# Patient Record
Sex: MALE | Race: WHITE
[De-identification: names, ages, dates, MRNs, and addresses within clinical notes are randomized per-mention and may not be internally consistent; named-entity substitution may affect disease eponyms.]

---

## 2019-01-24 ENCOUNTER — HOSPITAL ENCOUNTER (OUTPATIENT)
Dept: HOSPITAL 8 - CFH | Age: 69
Discharge: HOME | End: 2019-01-24
Attending: REGISTERED NURSE
Payer: MEDICARE

## 2019-01-24 DIAGNOSIS — J18.1: Primary | ICD-10-CM

## 2019-01-24 DIAGNOSIS — R91.8: ICD-10-CM

## 2019-01-24 PROCEDURE — 71046 X-RAY EXAM CHEST 2 VIEWS: CPT

## 2019-04-24 ENCOUNTER — HOSPITAL ENCOUNTER (OUTPATIENT)
Dept: HOSPITAL 8 - PETCFH | Age: 69
Discharge: HOME | End: 2019-04-24
Attending: RADIOLOGY
Payer: MEDICARE

## 2019-04-24 DIAGNOSIS — K57.30: ICD-10-CM

## 2019-04-24 DIAGNOSIS — C43.8: ICD-10-CM

## 2019-04-24 DIAGNOSIS — C85.90: Primary | ICD-10-CM

## 2019-04-24 DIAGNOSIS — I25.10: ICD-10-CM

## 2019-04-24 PROCEDURE — A9552 F18 FDG: HCPCS

## 2019-04-24 PROCEDURE — 78816 PET IMAGE W/CT FULL BODY: CPT

## 2019-04-29 ENCOUNTER — HOSPITAL ENCOUNTER (OUTPATIENT)
Dept: HOSPITAL 8 - CFH | Age: 69
Discharge: HOME | End: 2019-04-29
Attending: INTERNAL MEDICINE
Payer: MEDICARE

## 2019-04-29 DIAGNOSIS — G31.89: ICD-10-CM

## 2019-04-29 DIAGNOSIS — C85.91: Primary | ICD-10-CM

## 2019-04-29 PROCEDURE — 70543 MRI ORBT/FAC/NCK W/O &W/DYE: CPT

## 2019-04-29 PROCEDURE — 70553 MRI BRAIN STEM W/O & W/DYE: CPT

## 2019-04-29 PROCEDURE — A9585 GADOBUTROL INJECTION: HCPCS

## 2019-05-01 ENCOUNTER — HOSPITAL ENCOUNTER (OUTPATIENT)
Dept: HOSPITAL 8 - OUT | Age: 69
Discharge: HOME | End: 2019-05-01
Attending: RADIOLOGY
Payer: MEDICARE

## 2019-05-01 VITALS — DIASTOLIC BLOOD PRESSURE: 80 MMHG | SYSTOLIC BLOOD PRESSURE: 157 MMHG

## 2019-05-01 VITALS — WEIGHT: 203.93 LBS | HEIGHT: 69 IN | BODY MASS INDEX: 30.2 KG/M2

## 2019-05-01 DIAGNOSIS — C85.91: Primary | ICD-10-CM

## 2019-05-01 DIAGNOSIS — Z88.8: ICD-10-CM

## 2019-05-01 DIAGNOSIS — I10: ICD-10-CM

## 2019-05-01 DIAGNOSIS — Z98.41: ICD-10-CM

## 2019-05-01 DIAGNOSIS — K21.9: ICD-10-CM

## 2019-05-01 DIAGNOSIS — E78.00: ICD-10-CM

## 2019-05-01 DIAGNOSIS — Z88.5: ICD-10-CM

## 2019-05-01 DIAGNOSIS — F41.9: ICD-10-CM

## 2019-05-01 DIAGNOSIS — Z87.442: ICD-10-CM

## 2019-05-01 DIAGNOSIS — E03.9: ICD-10-CM

## 2019-05-01 DIAGNOSIS — M10.9: ICD-10-CM

## 2019-05-01 DIAGNOSIS — Z85.46: ICD-10-CM

## 2019-05-01 DIAGNOSIS — Z98.890: ICD-10-CM

## 2019-05-01 DIAGNOSIS — F32.9: ICD-10-CM

## 2019-05-01 DIAGNOSIS — Z87.01: ICD-10-CM

## 2019-05-01 LAB
BASOPHILS # BLD AUTO: 0.01 X10^3/UL (ref 0–0.1)
BASOPHILS NFR BLD AUTO: 0 % (ref 0–1)
EOSINOPHIL # BLD AUTO: 0.13 X10^3/UL (ref 0–0.4)
EOSINOPHIL NFR BLD AUTO: 2 % (ref 1–7)
ERYTHROCYTE [DISTWIDTH] IN BLOOD BY AUTOMATED COUNT: 13.9 % (ref 9.4–14.8)
LYMPHOCYTES # BLD AUTO: 1.64 X10^3/UL (ref 1–3.4)
LYMPHOCYTES NFR BLD AUTO: 25 % (ref 22–44)
MCH RBC QN AUTO: 31.2 PG (ref 27.5–34.5)
MCHC RBC AUTO-ENTMCNC: 33.5 G/DL (ref 33.2–36.2)
MCV RBC AUTO: 93.3 FL (ref 81–97)
MD: NO
MONOCYTES # BLD AUTO: 0.8 X10^3/UL (ref 0.2–0.8)
MONOCYTES NFR BLD AUTO: 12 % (ref 2–9)
NEUTROPHILS # BLD AUTO: 4.09 X10^3/UL (ref 1.8–6.8)
NEUTROPHILS NFR BLD AUTO: 61 % (ref 42–75)
PLATELET # BLD AUTO: 287 X10^3/UL (ref 130–400)
PMV BLD AUTO: 8.2 FL (ref 7.4–10.4)
RBC # BLD AUTO: 4.64 X10^6/UL (ref 4.38–5.82)

## 2019-05-01 PROCEDURE — 77012 CT SCAN FOR NEEDLE BIOPSY: CPT

## 2019-05-01 PROCEDURE — 99156 MOD SED OTH PHYS/QHP 5/>YRS: CPT

## 2019-05-01 PROCEDURE — 85060 BLOOD SMEAR INTERPRETATION: CPT

## 2019-05-01 PROCEDURE — 99157 MOD SED OTHER PHYS/QHP EA: CPT

## 2019-05-01 PROCEDURE — 88264 CHROMOSOME ANALYSIS 20-25: CPT

## 2019-05-01 PROCEDURE — 36415 COLL VENOUS BLD VENIPUNCTURE: CPT

## 2019-05-01 PROCEDURE — 88237 TISSUE CULTURE BONE MARROW: CPT

## 2019-05-01 PROCEDURE — 38222 DX BONE MARROW BX & ASPIR: CPT

## 2019-05-01 PROCEDURE — 85025 COMPLETE CBC W/AUTO DIFF WBC: CPT

## 2019-05-01 PROCEDURE — 88311 DECALCIFY TISSUE: CPT

## 2019-05-01 PROCEDURE — 88280 CHROMOSOME KARYOTYPE STUDY: CPT

## 2019-05-01 PROCEDURE — 85097 BONE MARROW INTERPRETATION: CPT

## 2019-05-01 PROCEDURE — 88305 TISSUE EXAM BY PATHOLOGIST: CPT

## 2019-05-03 ENCOUNTER — HOSPITAL ENCOUNTER (OUTPATIENT)
Dept: HOSPITAL 8 - CFH | Age: 69
Discharge: HOME | End: 2019-05-03
Attending: INTERNAL MEDICINE
Payer: MEDICARE

## 2019-05-03 DIAGNOSIS — I08.0: ICD-10-CM

## 2019-05-03 DIAGNOSIS — Z13.6: Primary | ICD-10-CM

## 2019-05-03 DIAGNOSIS — E78.5: ICD-10-CM

## 2019-05-03 DIAGNOSIS — I10: ICD-10-CM

## 2019-05-03 PROCEDURE — 93306 TTE W/DOPPLER COMPLETE: CPT

## 2019-05-08 ENCOUNTER — HOSPITAL ENCOUNTER (OUTPATIENT)
Dept: HOSPITAL 8 - OUT | Age: 69
Discharge: HOME | End: 2019-05-08
Attending: INTERNAL MEDICINE
Payer: MEDICARE

## 2019-05-08 VITALS — SYSTOLIC BLOOD PRESSURE: 154 MMHG | DIASTOLIC BLOOD PRESSURE: 71 MMHG

## 2019-05-08 VITALS — WEIGHT: 205.69 LBS | BODY MASS INDEX: 29.45 KG/M2 | HEIGHT: 70 IN

## 2019-05-08 DIAGNOSIS — I10: ICD-10-CM

## 2019-05-08 DIAGNOSIS — Z98.49: ICD-10-CM

## 2019-05-08 DIAGNOSIS — Z45.2: Primary | ICD-10-CM

## 2019-05-08 DIAGNOSIS — C85.90: ICD-10-CM

## 2019-05-08 DIAGNOSIS — Z87.891: ICD-10-CM

## 2019-05-08 DIAGNOSIS — Z98.890: ICD-10-CM

## 2019-05-08 PROCEDURE — C1788 PORT, INDWELLING, IMP: HCPCS

## 2019-05-08 PROCEDURE — 99157 MOD SED OTHER PHYS/QHP EA: CPT

## 2019-05-08 PROCEDURE — 77001 FLUOROGUIDE FOR VEIN DEVICE: CPT

## 2019-05-08 PROCEDURE — 36561 INSERT TUNNELED CV CATH: CPT

## 2019-05-08 PROCEDURE — 76937 US GUIDE VASCULAR ACCESS: CPT

## 2019-05-08 PROCEDURE — 99156 MOD SED OTH PHYS/QHP 5/>YRS: CPT

## 2019-05-30 ENCOUNTER — HOSPITAL ENCOUNTER (OUTPATIENT)
Dept: HOSPITAL 8 - CFH | Age: 69
Discharge: HOME | End: 2019-05-30
Attending: INTERNAL MEDICINE
Payer: MEDICARE

## 2019-05-30 DIAGNOSIS — Z51.11: Primary | ICD-10-CM

## 2019-05-30 DIAGNOSIS — D70.1: ICD-10-CM

## 2019-05-30 DIAGNOSIS — C85.11: ICD-10-CM

## 2019-05-30 DIAGNOSIS — R50.9: ICD-10-CM

## 2019-05-30 PROCEDURE — 71046 X-RAY EXAM CHEST 2 VIEWS: CPT

## 2019-06-21 ENCOUNTER — HOSPITAL ENCOUNTER (INPATIENT)
Dept: HOSPITAL 8 - ED | Age: 69
LOS: 2 days | Discharge: HOME | DRG: 809 | End: 2019-06-23
Attending: FAMILY MEDICINE | Admitting: FAMILY MEDICINE
Payer: MEDICARE

## 2019-06-21 VITALS — WEIGHT: 169.8 LBS | HEIGHT: 67 IN | BODY MASS INDEX: 26.65 KG/M2

## 2019-06-21 DIAGNOSIS — E78.5: ICD-10-CM

## 2019-06-21 DIAGNOSIS — Z90.79: ICD-10-CM

## 2019-06-21 DIAGNOSIS — M10.9: ICD-10-CM

## 2019-06-21 DIAGNOSIS — K59.00: ICD-10-CM

## 2019-06-21 DIAGNOSIS — E11.65: ICD-10-CM

## 2019-06-21 DIAGNOSIS — I10: ICD-10-CM

## 2019-06-21 DIAGNOSIS — G47.30: ICD-10-CM

## 2019-06-21 DIAGNOSIS — D70.1: Primary | ICD-10-CM

## 2019-06-21 DIAGNOSIS — C83.30: ICD-10-CM

## 2019-06-21 DIAGNOSIS — E86.0: ICD-10-CM

## 2019-06-21 DIAGNOSIS — R50.81: ICD-10-CM
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
## 2019-06-21 DIAGNOSIS — E03.9: ICD-10-CM

## 2019-06-21 DIAGNOSIS — Z85.46: ICD-10-CM

## 2019-06-21 DIAGNOSIS — T45.1X5A: ICD-10-CM

## 2019-06-21 LAB
<PLATELET ESTIMATE>: ADEQUATE
<PLT MORPHOLOGY>: (no result)
ALBUMIN SERPL-MCNC: 3.3 G/DL (ref 3.4–5)
ALP SERPL-CCNC: 95 U/L (ref 45–117)
ALT SERPL-CCNC: 43 U/L (ref 12–78)
ANION GAP SERPL CALC-SCNC: 7 MMOL/L (ref 5–15)
BAND#(MANUAL): 0.38 X10^3/UL
BASOPHILS NFR BLD MANUAL: 1 % (ref 0–1)
BASOS#(MANUAL): 0.02 X10^3/UL (ref 0–0.1)
BILIRUB DIRECT SERPL-MCNC: NORMAL MG/DL
BILIRUB SERPL-MCNC: 0.9 MG/DL (ref 0.2–1)
CALCIUM SERPL-MCNC: 8.8 MG/DL (ref 8.5–10.1)
CHLORIDE SERPL-SCNC: 101 MMOL/L (ref 98–107)
CREAT SERPL-MCNC: 1.05 MG/DL (ref 0.7–1.3)
CULTURE INDICATED?: NO
EOS#(MANUAL): 0.03 X10^3/UL (ref 0–0.4)
EOS% (MANUAL): 2 % (ref 1–7)
ERYTHROCYTE [DISTWIDTH] IN BLOOD BY AUTOMATED COUNT: 13.3 % (ref 9.4–14.8)
LYMPH#(MANUAL): 0.45 X10^3/UL (ref 1–3.4)
LYMPHS% (MANUAL): 28 % (ref 22–44)
MCH RBC QN AUTO: 31.8 PG (ref 27.5–34.5)
MCHC RBC AUTO-ENTMCNC: 34.5 G/DL (ref 33.2–36.2)
MCV RBC AUTO: 92.2 FL (ref 81–97)
MD: YES
METAMYELOCYTES# (MANUAL): 0.05 X10^3/UL (ref 0–0)
METAMYELOCYTES% (MANUAL): 3 % (ref 0–1)
MICROSCOPIC: (no result)
MONOS#(MANUAL): 0.21 X10^3/UL (ref 0.3–2.7)
MONOS% (MANUAL): 13 % (ref 2–9)
MYELOCYTES# (MANUAL): 0.05 X10^3/UL (ref 0–0)
MYELOCYTES% (MANUAL): 3 % (ref 0–0)
NEUTS BAND NFR BLD: 24 % (ref 0–7)
OTHER CELLS # (MANUAL): 0.02 X10^3/UL (ref 0–0)
OTHER CELLS % (MANUAL): 1 % (ref 0–0)
PLATELET # BLD AUTO: 192 X10^3/UL (ref 130–400)
PMV BLD AUTO: 8.1 FL (ref 7.4–10.4)
PROT SERPL-MCNC: 6.3 G/DL (ref 6.4–8.2)
RBC # BLD AUTO: 3.92 X10^6/UL (ref 4.38–5.82)
REACTIVE LYMPHS # (MANUAL): 0.1 X10^3/UL (ref 0–0)
REACTIVE LYMPHS % (MANUAL): 6 % (ref 0–0)
SEG#(MANUAL): 0.3 X10^3/UL (ref 1.8–6.8)
SEGS% (MANUAL): 19 % (ref 42–75)
TROPONIN I SERPL-MCNC: < 0.015 NG/ML (ref 0–0.04)

## 2019-06-21 PROCEDURE — 84145 PROCALCITONIN (PCT): CPT

## 2019-06-21 PROCEDURE — 81003 URINALYSIS AUTO W/O SCOPE: CPT

## 2019-06-21 PROCEDURE — 80053 COMPREHEN METABOLIC PANEL: CPT

## 2019-06-21 PROCEDURE — 84484 ASSAY OF TROPONIN QUANT: CPT

## 2019-06-21 PROCEDURE — 36415 COLL VENOUS BLD VENIPUNCTURE: CPT

## 2019-06-21 PROCEDURE — 85651 RBC SED RATE NONAUTOMATED: CPT

## 2019-06-21 PROCEDURE — 86140 C-REACTIVE PROTEIN: CPT

## 2019-06-21 PROCEDURE — 96374 THER/PROPH/DIAG INJ IV PUSH: CPT

## 2019-06-21 PROCEDURE — 85025 COMPLETE CBC W/AUTO DIFF WBC: CPT

## 2019-06-21 PROCEDURE — 83036 HEMOGLOBIN GLYCOSYLATED A1C: CPT

## 2019-06-21 PROCEDURE — 93005 ELECTROCARDIOGRAM TRACING: CPT

## 2019-06-21 PROCEDURE — 82962 GLUCOSE BLOOD TEST: CPT

## 2019-06-21 PROCEDURE — 71045 X-RAY EXAM CHEST 1 VIEW: CPT

## 2019-06-21 PROCEDURE — 87040 BLOOD CULTURE FOR BACTERIA: CPT

## 2019-06-22 VITALS — DIASTOLIC BLOOD PRESSURE: 73 MMHG | SYSTOLIC BLOOD PRESSURE: 157 MMHG

## 2019-06-22 VITALS — DIASTOLIC BLOOD PRESSURE: 69 MMHG | SYSTOLIC BLOOD PRESSURE: 121 MMHG

## 2019-06-22 VITALS — DIASTOLIC BLOOD PRESSURE: 76 MMHG | SYSTOLIC BLOOD PRESSURE: 136 MMHG

## 2019-06-22 VITALS — DIASTOLIC BLOOD PRESSURE: 73 MMHG | SYSTOLIC BLOOD PRESSURE: 132 MMHG

## 2019-06-22 LAB
<PLATELET ESTIMATE>: ADEQUATE
<PLT MORPHOLOGY>: (no result)
ANISOCYTOSIS BLD QL SMEAR: (no result)
BAND#(MANUAL): 0.46 X10^3/UL
BASOPHILS NFR BLD MANUAL: 1 % (ref 0–1)
BASOS#(MANUAL): 0.02 X10^3/UL (ref 0–0.1)
ERYTHROCYTE [DISTWIDTH] IN BLOOD BY AUTOMATED COUNT: 13.5 % (ref 9.4–14.8)
EST. AVERAGE GLUCOSE BLD GHB EST-MCNC: 214 MG/DL (ref 0–126)
HBA1C MFR BLD: 9.1 % (ref 4.2–6.3)
LYMPH#(MANUAL): 0.48 X10^3/UL (ref 1–3.4)
LYMPHS% (MANUAL): 22 % (ref 22–44)
MCH RBC QN AUTO: 32.2 PG (ref 27.5–34.5)
MCHC RBC AUTO-ENTMCNC: 34.6 G/DL (ref 33.2–36.2)
MCV RBC AUTO: 93.3 FL (ref 81–97)
MD: YES
METAMYELOCYTES# (MANUAL): 0.07 X10^3/UL (ref 0–0)
METAMYELOCYTES% (MANUAL): 3 % (ref 0–1)
MONOS#(MANUAL): 0.29 X10^3/UL (ref 0.3–2.7)
MONOS% (MANUAL): 13 % (ref 2–9)
MYELOCYTES# (MANUAL): 0.09 X10^3/UL (ref 0–0)
MYELOCYTES% (MANUAL): 4 % (ref 0–0)
NEUTS BAND NFR BLD: 21 % (ref 0–7)
OVALOCYTES BLD QL SMEAR: (no result)
PLATELET # BLD AUTO: 151 X10^3/UL (ref 130–400)
PMV BLD AUTO: 8.1 FL (ref 7.4–10.4)
POLYCHROMASIA BLD QL SMEAR: (no result)
RBC # BLD AUTO: 3.51 X10^6/UL (ref 4.38–5.82)
REACTIVE LYMPHS # (MANUAL): 0.13 X10^3/UL (ref 0–0)
REACTIVE LYMPHS % (MANUAL): 6 % (ref 0–0)
SEG#(MANUAL): 0.66 X10^3/UL (ref 1.8–6.8)
SEGS% (MANUAL): 30 % (ref 42–75)
TEAR DROPS: (no result)

## 2019-06-22 RX ADMIN — AMLODIPINE BESYLATE SCH MG: 5 TABLET ORAL at 09:41

## 2019-06-22 RX ADMIN — VITAMIN D, TAB 1000IU (100/BT) SCH UNITS: 25 TAB at 09:41

## 2019-06-22 RX ADMIN — INSULIN LISPRO SCH UNITS: 100 INJECTION, SOLUTION INTRAVENOUS; SUBCUTANEOUS at 16:29

## 2019-06-22 RX ADMIN — AMLODIPINE BESYLATE SCH MG: 5 TABLET ORAL at 20:26

## 2019-06-22 RX ADMIN — BENAZEPRIL HYDROCHLORIDE SCH MG: 20 TABLET, COATED ORAL at 09:40

## 2019-06-22 RX ADMIN — LACTOBACILLUS TAB SCH TAB: TAB at 09:40

## 2019-06-22 RX ADMIN — SODIUM CHLORIDE SCH MLS/HR: 0.9 INJECTION, SOLUTION INTRAVENOUS at 01:17

## 2019-06-22 RX ADMIN — METOPROLOL TARTRATE SCH MG: 25 TABLET, FILM COATED ORAL at 09:40

## 2019-06-22 RX ADMIN — LEVOTHYROXINE SODIUM SCH MCG: 25 TABLET ORAL at 09:41

## 2019-06-22 RX ADMIN — MAGNESIUM OXIDE SCH MG: 400 TABLET ORAL at 09:40

## 2019-06-22 RX ADMIN — CEFEPIME SCH MLS/HR: 2 INJECTION, POWDER, FOR SOLUTION INTRAVENOUS at 01:17

## 2019-06-22 RX ADMIN — SODIUM CHLORIDE SCH MLS/HR: 0.9 INJECTION, SOLUTION INTRAVENOUS at 16:30

## 2019-06-22 RX ADMIN — INSULIN LISPRO SCH UNITS: 100 INJECTION, SOLUTION INTRAVENOUS; SUBCUTANEOUS at 20:31

## 2019-06-22 RX ADMIN — CEFEPIME SCH MLS/HR: 2 INJECTION, POWDER, FOR SOLUTION INTRAVENOUS at 16:26

## 2019-06-22 RX ADMIN — ALLOPURINOL SCH MG: 300 TABLET ORAL at 09:40

## 2019-06-22 RX ADMIN — CEFEPIME SCH MLS/HR: 2 INJECTION, POWDER, FOR SOLUTION INTRAVENOUS at 08:10

## 2019-06-22 RX ADMIN — INSULIN LISPRO SCH UNITS: 100 INJECTION, SOLUTION INTRAVENOUS; SUBCUTANEOUS at 11:27

## 2019-06-22 RX ADMIN — INSULIN LISPRO SCH UNITS: 100 INJECTION, SOLUTION INTRAVENOUS; SUBCUTANEOUS at 08:10

## 2019-06-23 VITALS — DIASTOLIC BLOOD PRESSURE: 73 MMHG | SYSTOLIC BLOOD PRESSURE: 163 MMHG

## 2019-06-23 VITALS — DIASTOLIC BLOOD PRESSURE: 69 MMHG | SYSTOLIC BLOOD PRESSURE: 142 MMHG

## 2019-06-23 VITALS — SYSTOLIC BLOOD PRESSURE: 92 MMHG | DIASTOLIC BLOOD PRESSURE: 62 MMHG

## 2019-06-23 LAB
<PLATELET ESTIMATE>: ADEQUATE
<PLT MORPHOLOGY>: (no result)
ALBUMIN SERPL-MCNC: 2.8 G/DL (ref 3.4–5)
ALP SERPL-CCNC: 83 U/L (ref 45–117)
ALT SERPL-CCNC: 30 U/L (ref 12–78)
ANION GAP SERPL CALC-SCNC: 3 MMOL/L (ref 5–15)
ANISOCYTOSIS BLD QL SMEAR: (no result)
BAND#(MANUAL): 0.91 X10^3/UL
BILIRUB SERPL-MCNC: 0.6 MG/DL (ref 0.2–1)
CALCIUM SERPL-MCNC: 8.4 MG/DL (ref 8.5–10.1)
CHLORIDE SERPL-SCNC: 106 MMOL/L (ref 98–107)
CREAT SERPL-MCNC: 1 MG/DL (ref 0.7–1.3)
ERYTHROCYTE [DISTWIDTH] IN BLOOD BY AUTOMATED COUNT: 13.4 % (ref 9.4–14.8)
ERYTHROCYTE [SEDIMENTATION RATE] IN BLOOD BY WESTERGREN METHOD: 57 MM/HR (ref 0–10)
HCT (SEDRATE): 33.1 % (ref 39.2–51.8)
LYMPH#(MANUAL): 0.63 X10^3/UL (ref 1–3.4)
LYMPHS% (MANUAL): 11 % (ref 22–44)
MCH RBC QN AUTO: 31.1 PG (ref 27.5–34.5)
MCHC RBC AUTO-ENTMCNC: 33.6 G/DL (ref 33.2–36.2)
MCV RBC AUTO: 92.5 FL (ref 81–97)
MD: YES
METAMYELOCYTES# (MANUAL): 0.06 X10^3/UL (ref 0–0)
METAMYELOCYTES% (MANUAL): 1 % (ref 0–1)
MONOS#(MANUAL): 0.74 X10^3/UL (ref 0.3–2.7)
MONOS% (MANUAL): 13 % (ref 2–9)
MYELOCYTES# (MANUAL): 0.17 X10^3/UL (ref 0–0)
MYELOCYTES% (MANUAL): 3 % (ref 0–0)
NEUTS BAND NFR BLD: 16 % (ref 0–7)
NRBC % (MANUAL): 1 % (ref 0–1)
OVALOCYTES BLD QL SMEAR: (no result)
PLATELET # BLD AUTO: 160 X10^3/UL (ref 130–400)
PMV BLD AUTO: 7.8 FL (ref 7.4–10.4)
POLYCHROMASIA BLD QL SMEAR: (no result)
PROT SERPL-MCNC: 6 G/DL (ref 6.4–8.2)
RBC # BLD AUTO: 3.58 X10^6/UL (ref 4.38–5.82)
SEG#(MANUAL): 3.19 X10^3/UL (ref 1.8–6.8)
SEGS% (MANUAL): 56 % (ref 42–75)

## 2019-06-23 RX ADMIN — LEVOTHYROXINE SODIUM SCH MCG: 25 TABLET ORAL at 09:06

## 2019-06-23 RX ADMIN — CEFEPIME SCH MLS/HR: 2 INJECTION, POWDER, FOR SOLUTION INTRAVENOUS at 00:45

## 2019-06-23 RX ADMIN — METOPROLOL TARTRATE SCH MG: 25 TABLET, FILM COATED ORAL at 09:07

## 2019-06-23 RX ADMIN — AMLODIPINE BESYLATE SCH MG: 5 TABLET ORAL at 09:06

## 2019-06-23 RX ADMIN — INSULIN LISPRO SCH UNITS: 100 INJECTION, SOLUTION INTRAVENOUS; SUBCUTANEOUS at 11:00

## 2019-06-23 RX ADMIN — BENAZEPRIL HYDROCHLORIDE SCH MG: 20 TABLET, COATED ORAL at 09:07

## 2019-06-23 RX ADMIN — SODIUM CHLORIDE SCH MLS/HR: 0.9 INJECTION, SOLUTION INTRAVENOUS at 04:41

## 2019-06-23 RX ADMIN — MAGNESIUM OXIDE SCH MG: 400 TABLET ORAL at 09:05

## 2019-06-23 RX ADMIN — CEFEPIME SCH MLS/HR: 2 INJECTION, POWDER, FOR SOLUTION INTRAVENOUS at 09:20

## 2019-06-23 RX ADMIN — ALLOPURINOL SCH MG: 300 TABLET ORAL at 09:07

## 2019-06-23 RX ADMIN — INSULIN LISPRO SCH UNITS: 100 INJECTION, SOLUTION INTRAVENOUS; SUBCUTANEOUS at 07:00

## 2019-06-23 RX ADMIN — LACTOBACILLUS TAB SCH TAB: TAB at 09:05

## 2019-06-23 RX ADMIN — VITAMIN D, TAB 1000IU (100/BT) SCH UNITS: 25 TAB at 09:05

## 2019-06-28 ENCOUNTER — HOSPITAL ENCOUNTER (OUTPATIENT)
Dept: HOSPITAL 8 - PETCFH | Age: 69
Discharge: HOME | End: 2019-06-28
Attending: INTERNAL MEDICINE
Payer: MEDICARE

## 2019-06-28 DIAGNOSIS — C85.11: Primary | ICD-10-CM

## 2019-06-28 DIAGNOSIS — J32.0: ICD-10-CM

## 2019-06-28 DIAGNOSIS — K57.30: ICD-10-CM

## 2019-06-28 PROCEDURE — 78815 PET IMAGE W/CT SKULL-THIGH: CPT

## 2019-06-28 PROCEDURE — A9552 F18 FDG: HCPCS

## 2019-07-25 ENCOUNTER — HOSPITAL ENCOUNTER (OUTPATIENT)
Dept: LAB | Facility: MEDICAL CENTER | Age: 69
End: 2019-07-25
Attending: NURSE PRACTITIONER
Payer: MEDICARE

## 2019-07-25 LAB
ALBUMIN SERPL BCP-MCNC: 3.8 G/DL (ref 3.2–4.9)
ALBUMIN/GLOB SERPL: 1.7 G/DL
ALP SERPL-CCNC: 60 U/L (ref 30–99)
ALT SERPL-CCNC: 18 U/L (ref 2–50)
ANION GAP SERPL CALC-SCNC: 13 MMOL/L (ref 0–11.9)
AST SERPL-CCNC: 16 U/L (ref 12–45)
BILIRUB SERPL-MCNC: 0.5 MG/DL (ref 0.1–1.5)
BUN SERPL-MCNC: 16 MG/DL (ref 8–22)
CALCIUM SERPL-MCNC: 9 MG/DL (ref 8.5–10.5)
CHLORIDE SERPL-SCNC: 105 MMOL/L (ref 96–112)
CO2 SERPL-SCNC: 19 MMOL/L (ref 20–33)
CREAT SERPL-MCNC: 1 MG/DL (ref 0.5–1.4)
GLOBULIN SER CALC-MCNC: 2.3 G/DL (ref 1.9–3.5)
GLUCOSE SERPL-MCNC: 205 MG/DL (ref 65–99)
LDH SERPL L TO P-CCNC: 191 U/L (ref 107–266)
POTASSIUM SERPL-SCNC: 4.9 MMOL/L (ref 3.6–5.5)
PROT SERPL-MCNC: 6.1 G/DL (ref 6–8.2)
SODIUM SERPL-SCNC: 137 MMOL/L (ref 135–145)
URATE SERPL-MCNC: 5.4 MG/DL (ref 2.5–8.3)

## 2019-07-25 PROCEDURE — 83615 LACTATE (LD) (LDH) ENZYME: CPT

## 2019-07-25 PROCEDURE — 84550 ASSAY OF BLOOD/URIC ACID: CPT

## 2019-07-25 PROCEDURE — 80053 COMPREHEN METABOLIC PANEL: CPT

## 2019-08-01 ENCOUNTER — HOSPITAL ENCOUNTER (EMERGENCY)
Dept: HOSPITAL 8 - ED | Age: 69
Discharge: HOME | End: 2019-08-01
Payer: MEDICARE

## 2019-08-01 VITALS — BODY MASS INDEX: 28.41 KG/M2 | HEIGHT: 69 IN | WEIGHT: 191.8 LBS

## 2019-08-01 VITALS — SYSTOLIC BLOOD PRESSURE: 136 MMHG | DIASTOLIC BLOOD PRESSURE: 58 MMHG

## 2019-08-01 DIAGNOSIS — R50.9: Primary | ICD-10-CM

## 2019-08-01 LAB
<PLATELET ESTIMATE>: ADEQUATE
<PLT MORPHOLOGY>: (no result)
ALBUMIN SERPL-MCNC: 3.5 G/DL (ref 3.4–5)
ALP SERPL-CCNC: 110 U/L (ref 45–117)
ALT SERPL-CCNC: 24 U/L (ref 12–78)
ANION GAP SERPL CALC-SCNC: 8 MMOL/L (ref 5–15)
ANISOCYTOSIS BLD QL SMEAR: (no result)
BAND#(MANUAL): 0.71 X10^3/UL
BILIRUB SERPL-MCNC: 1.2 MG/DL (ref 0.2–1)
CALCIUM SERPL-MCNC: 8.7 MG/DL (ref 8.5–10.1)
CHLORIDE SERPL-SCNC: 102 MMOL/L (ref 98–107)
CREAT SERPL-MCNC: 1.02 MG/DL (ref 0.7–1.3)
CULTURE INDICATED?: NO
EOS#(MANUAL): 0.09 X10^3/UL (ref 0–0.4)
EOS% (MANUAL): 1 % (ref 1–7)
ERYTHROCYTE [DISTWIDTH] IN BLOOD BY AUTOMATED COUNT: 16.9 % (ref 9.4–14.8)
LYMPH#(MANUAL): 0.45 X10^3/UL (ref 1–3.4)
LYMPHS% (MANUAL): 5 % (ref 22–44)
MCH RBC QN AUTO: 32.1 PG (ref 27.5–34.5)
MCHC RBC AUTO-ENTMCNC: 33.6 G/DL (ref 33.2–36.2)
MCV RBC AUTO: 95.6 FL (ref 81–97)
MD: YES
MICROSCOPIC: (no result)
MONOS#(MANUAL): 0.36 X10^3/UL (ref 0.3–2.7)
MONOS% (MANUAL): 4 % (ref 2–9)
NEUTS BAND NFR BLD: 8 % (ref 0–7)
OVALOCYTES BLD QL SMEAR: (no result)
PLATELET # BLD AUTO: 233 X10^3/UL (ref 130–400)
PMV BLD AUTO: 7.1 FL (ref 7.4–10.4)
POLYCHROMASIA BLD QL SMEAR: (no result)
PROT SERPL-MCNC: 6.6 G/DL (ref 6.4–8.2)
RBC # BLD AUTO: 3.62 X10^6/UL (ref 4.38–5.82)
SEG#(MANUAL): 7.3 X10^3/UL (ref 1.8–6.8)
SEGS% (MANUAL): 82 % (ref 42–75)
TOXIC GRAN: (no result)

## 2019-08-01 PROCEDURE — 80048 BASIC METABOLIC PNL TOTAL CA: CPT

## 2019-08-01 PROCEDURE — 85025 COMPLETE CBC W/AUTO DIFF WBC: CPT

## 2019-08-01 PROCEDURE — 82040 ASSAY OF SERUM ALBUMIN: CPT

## 2019-08-01 PROCEDURE — 71046 X-RAY EXAM CHEST 2 VIEWS: CPT

## 2019-08-01 PROCEDURE — 84145 PROCALCITONIN (PCT): CPT

## 2019-08-01 PROCEDURE — 83605 ASSAY OF LACTIC ACID: CPT

## 2019-08-01 PROCEDURE — 99284 EMERGENCY DEPT VISIT MOD MDM: CPT

## 2019-08-01 PROCEDURE — 80076 HEPATIC FUNCTION PANEL: CPT

## 2019-08-01 PROCEDURE — 87040 BLOOD CULTURE FOR BACTERIA: CPT

## 2019-08-01 PROCEDURE — 81003 URINALYSIS AUTO W/O SCOPE: CPT

## 2019-08-01 PROCEDURE — 96365 THER/PROPH/DIAG IV INF INIT: CPT

## 2019-08-01 PROCEDURE — 36415 COLL VENOUS BLD VENIPUNCTURE: CPT

## 2019-08-08 ENCOUNTER — HOSPITAL ENCOUNTER (OUTPATIENT)
Dept: RADIOLOGY | Facility: MEDICAL CENTER | Age: 69
End: 2019-08-08
Attending: INTERNAL MEDICINE
Payer: MEDICARE

## 2019-08-08 DIAGNOSIS — C85.11 UNSPECIFIED B-CELL LYMPHOMA, LYMPH NODES OF HEAD, FACE, AND NECK (HCC): ICD-10-CM

## 2019-08-08 PROCEDURE — A9552 F18 FDG: HCPCS

## 2019-08-09 ENCOUNTER — HOSPITAL ENCOUNTER (OUTPATIENT)
Dept: RADIOLOGY | Facility: MEDICAL CENTER | Age: 69
End: 2019-08-09

## 2019-10-07 ENCOUNTER — HOSPITAL ENCOUNTER (OUTPATIENT)
Dept: RADIOLOGY | Facility: MEDICAL CENTER | Age: 69
End: 2019-10-07
Attending: INTERNAL MEDICINE
Payer: MEDICARE

## 2019-10-07 DIAGNOSIS — C85.11 UNSPECIFIED B-CELL LYMPHOMA, LYMPH NODES OF HEAD, FACE, AND NECK (HCC): ICD-10-CM

## 2019-10-07 PROCEDURE — A9552 F18 FDG: HCPCS

## 2019-11-05 ENCOUNTER — HOSPITAL ENCOUNTER (OUTPATIENT)
Dept: RADIOLOGY | Facility: MEDICAL CENTER | Age: 69
End: 2019-11-05

## 2019-11-05 ENCOUNTER — HOSPITAL ENCOUNTER (OUTPATIENT)
Dept: RADIOLOGY | Facility: MEDICAL CENTER | Age: 69
End: 2019-11-05
Attending: INTERNAL MEDICINE
Payer: MEDICARE

## 2019-11-05 DIAGNOSIS — C85.11 UNSPECIFIED B-CELL LYMPHOMA, LYMPH NODES OF HEAD, FACE, AND NECK (HCC): ICD-10-CM

## 2019-11-05 PROCEDURE — 700117 HCHG RX CONTRAST REV CODE 255: Performed by: INTERNAL MEDICINE

## 2019-11-05 PROCEDURE — 74183 MRI ABD W/O CNTR FLWD CNTR: CPT

## 2019-11-05 PROCEDURE — 70543 MRI ORBT/FAC/NCK W/O &W/DYE: CPT

## 2019-11-05 PROCEDURE — A9576 INJ PROHANCE MULTIPACK: HCPCS | Performed by: INTERNAL MEDICINE

## 2019-11-05 RX ADMIN — GADOTERIDOL 18 ML: 279.3 INJECTION, SOLUTION INTRAVENOUS at 16:35

## 2020-02-05 ENCOUNTER — HOSPITAL ENCOUNTER (OUTPATIENT)
Dept: RADIOLOGY | Facility: MEDICAL CENTER | Age: 70
End: 2020-02-05
Attending: INTERNAL MEDICINE
Payer: MEDICARE

## 2020-02-05 DIAGNOSIS — C85.11 UNSPECIFIED B-CELL LYMPHOMA, LYMPH NODES OF HEAD, FACE, AND NECK (HCC): ICD-10-CM

## 2020-02-05 PROCEDURE — A9552 F18 FDG: HCPCS

## 2020-02-18 RX ORDER — SODIUM CHLORIDE 9 MG/ML
INJECTION, SOLUTION INTRAVENOUS CONTINUOUS
Status: CANCELLED | OUTPATIENT
Start: 2020-02-20

## 2020-02-20 ENCOUNTER — HOSPITAL ENCOUNTER (OUTPATIENT)
Facility: MEDICAL CENTER | Age: 70
End: 2020-02-20
Attending: INTERNAL MEDICINE | Admitting: INTERNAL MEDICINE
Payer: MEDICARE

## 2020-02-20 ENCOUNTER — APPOINTMENT (OUTPATIENT)
Dept: RADIOLOGY | Facility: MEDICAL CENTER | Age: 70
End: 2020-02-20
Attending: INTERNAL MEDICINE
Payer: MEDICARE

## 2020-02-20 VITALS
HEART RATE: 77 BPM | WEIGHT: 183 LBS | BODY MASS INDEX: 26.2 KG/M2 | SYSTOLIC BLOOD PRESSURE: 159 MMHG | DIASTOLIC BLOOD PRESSURE: 74 MMHG | OXYGEN SATURATION: 99 % | RESPIRATION RATE: 16 BRPM | HEIGHT: 70 IN

## 2020-02-20 DIAGNOSIS — C85.11 UNSPECIFIED B-CELL LYMPHOMA, LYMPH NODES OF HEAD, FACE, AND NECK (HCC): ICD-10-CM

## 2020-02-20 PROCEDURE — 700105 HCHG RX REV CODE 258: Performed by: NURSE PRACTITIONER

## 2020-02-20 PROCEDURE — 700101 HCHG RX REV CODE 250

## 2020-02-20 PROCEDURE — 36590 REMOVAL TUNNELED CV CATH: CPT

## 2020-02-20 RX ORDER — SODIUM CHLORIDE 9 MG/ML
INJECTION, SOLUTION INTRAVENOUS CONTINUOUS
Status: DISCONTINUED | OUTPATIENT
Start: 2020-02-20 | End: 2020-02-20 | Stop reason: HOSPADM

## 2020-02-20 RX ORDER — LIDOCAINE HYDROCHLORIDE AND EPINEPHRINE 10; 10 MG/ML; UG/ML
INJECTION, SOLUTION INFILTRATION; PERINEURAL
Status: COMPLETED
Start: 2020-02-20 | End: 2020-02-20

## 2020-02-20 RX ADMIN — LIDOCAINE HYDROCHLORIDE,EPINEPHRINE BITARTRATE: 10; .01 INJECTION, SOLUTION INFILTRATION; PERINEURAL at 14:48

## 2020-02-20 NOTE — PROGRESS NOTES
Discharge instructions reviewed with patient and family, all questions answered. Belongings returned.  Patient ambulated out in a stable condition.         Implanted Port Removal  Introduction  Implanted port removal is a procedure to remove the port and catheter (port-a-cath) that is implanted under your skin. The port is a small disc under your skin that can be punctured with a needle. It is connected to a vein in your chest or neck by a small flexible tube (catheter). The port-a-cath is used for treatment through an IV tube and for taking blood samples.  Your health care provider will remove the port-a-cath if:  · You no longer need it for treatment.  · It is not working properly.  · The area around it gets infected.  Tell a health care provider about:  · Any allergies you have.  · All medicines you are taking, including vitamins, herbs, eye drops, creams, and over-the-counter medicines.  · Any problems you or family members have had with anesthetic medicines.  · Any blood disorders you have.  · Any surgeries you have had.  · Any medical conditions you have.  · Whether you are pregnant or may be pregnant.  What are the risks?  Generally, this is a safe procedure. However, problems may occur, including:  · Infection.  · Bleeding.  · Allergic reactions to anesthetic medicines.  · Damage to nerves or blood vessels.  What happens before the procedure?  · You will have:  ¨ A physical exam.  ¨ Blood tests.  ¨ Imaging tests, including a chest X-ray.  · Follow instructions from your health care provider about eating or drinking restrictions.  · Ask your health care provider about:  ¨ Changing or stopping your regular medicines. This is especially important if you are taking diabetes medicines or blood thinners.  ¨ Taking medicines such as aspirin and ibuprofen. These medicines can thin your blood. Do not take these medicines before your procedure if your surgeon instructs you not to.  · Ask your health care provider how  your surgical site will be marked or identified.  · You may be given antibiotic medicine to help prevent infection.  · Plan to have someone take you home after the procedure.  · If you will be going home right after the procedure, plan to have someone stay with you for 24 hours.  What happens during the procedure?  · To reduce your risk of infection:  ¨ Your health care team will wash or sanitize their hands.  ¨ Your skin will be washed with soap.  · You may be given one or more of the following:  ¨ A medicine to help you relax (sedative).  ¨ A medicine to numb the area (local anesthetic).  · A small cut (incision) will be made at the site of your port-a-cath.  · The port-a-cath and the catheter that has been inside your vein will gently be removed.  · The incision will be closed with stitches (sutures), adhesive strips, or skin glue.  · A bandage (dressing) will be placed over the incision.  The procedure may vary among health care providers and hospitals.  What happens after the procedure?  · Your blood pressure, heart rate, breathing rate, and blood oxygen level will be monitored often until the medicines you were given have worn off.  · Do not drive for 24 hours if you received a sedative.  This information is not intended to replace advice given to you by your health care provider. Make sure you discuss any questions you have with your health care provider.  Document Released: 11/28/2016 Document Revised: 05/25/2017 Document Reviewed: 09/21/2016  © 2017 [employer]

## 2020-02-20 NOTE — PROGRESS NOTES
OP IR RN note:    Site Marked and Confirmed with MD, patient and RN pre procedure   Tunneled port removal non sedation by MD Owen assisted by RT Megan, Right chest access site;  Pressure held by RT  Port intact  Patient tolerated procedure, hemodynamically stable; pt awake and talking post procedure; see flow sheet for vitals and post op print out    patient transported back to OP IR pod 1 via IR RN monitored        No bleeding or complications noted at this time

## 2020-02-20 NOTE — DISCHARGE INSTRUCTIONS
Implanted Port Removal  Introduction  Implanted port removal is a procedure to remove the port and catheter (port-a-cath) that is implanted under your skin. The port is a small disc under your skin that can be punctured with a needle. It is connected to a vein in your chest or neck by a small flexible tube (catheter). The port-a-cath is used for treatment through an IV tube and for taking blood samples.  Your health care provider will remove the port-a-cath if:  · You no longer need it for treatment.  · It is not working properly.  · The area around it gets infected.  Tell a health care provider about:  · Any allergies you have.  · All medicines you are taking, including vitamins, herbs, eye drops, creams, and over-the-counter medicines.  · Any problems you or family members have had with anesthetic medicines.  · Any blood disorders you have.  · Any surgeries you have had.  · Any medical conditions you have.  · Whether you are pregnant or may be pregnant.  What are the risks?  Generally, this is a safe procedure. However, problems may occur, including:  · Infection.  · Bleeding.  · Allergic reactions to anesthetic medicines.  · Damage to nerves or blood vessels.  What happens before the procedure?  · You will have:  ¨ A physical exam.  ¨ Blood tests.  ¨ Imaging tests, including a chest X-ray.  · Follow instructions from your health care provider about eating or drinking restrictions.  · Ask your health care provider about:  ¨ Changing or stopping your regular medicines. This is especially important if you are taking diabetes medicines or blood thinners.  ¨ Taking medicines such as aspirin and ibuprofen. These medicines can thin your blood. Do not take these medicines before your procedure if your surgeon instructs you not to.  · Ask your health care provider how your surgical site will be marked or identified.  · You may be given antibiotic medicine to help prevent infection.  · Plan to have someone take you home  after the procedure.  · If you will be going home right after the procedure, plan to have someone stay with you for 24 hours.  What happens during the procedure?  · To reduce your risk of infection:  ¨ Your health care team will wash or sanitize their hands.  ¨ Your skin will be washed with soap.  · You may be given one or more of the following:  ¨ A medicine to help you relax (sedative).  ¨ A medicine to numb the area (local anesthetic).  · A small cut (incision) will be made at the site of your port-a-cath.  · The port-a-cath and the catheter that has been inside your vein will gently be removed.  · The incision will be closed with stitches (sutures), adhesive strips, or skin glue.  · A bandage (dressing) will be placed over the incision.  The procedure may vary among health care providers and hospitals.  What happens after the procedure?  · Your blood pressure, heart rate, breathing rate, and blood oxygen level will be monitored often until the medicines you were given have worn off.  · Do not drive for 24 hours if you received a sedative.  This information is not intended to replace advice given to you by your health care provider. Make sure you discuss any questions you have with your health care provider.  Document Released: 11/28/2016 Document Revised: 05/25/2017 Document Reviewed: 09/21/2016  © 2017 [employer]

## 2020-02-20 NOTE — OR SURGEON
Immediate Post- Operative Note        PostOp Diagnosis: completed lymphoma tx      Procedure(s): chest port removal      Estimated Blood Loss: Less than 5 ml        Complications: None            2/20/2020     3:03 PM     Taj Owen M.D.

## 2020-07-22 ENCOUNTER — HOSPITAL ENCOUNTER (OUTPATIENT)
Dept: RADIOLOGY | Facility: MEDICAL CENTER | Age: 70
End: 2020-07-22
Attending: INTERNAL MEDICINE
Payer: MEDICARE

## 2020-07-22 DIAGNOSIS — C85.11 UNSPECIFIED B-CELL LYMPHOMA, LYMPH NODES OF HEAD, FACE, AND NECK (HCC): ICD-10-CM

## 2020-07-22 PROCEDURE — 70487 CT MAXILLOFACIAL W/DYE: CPT

## 2020-07-22 PROCEDURE — 71260 CT THORAX DX C+: CPT

## 2020-07-22 PROCEDURE — 700117 HCHG RX CONTRAST REV CODE 255: Performed by: INTERNAL MEDICINE

## 2020-07-22 RX ADMIN — IOHEXOL 25 ML: 240 INJECTION, SOLUTION INTRATHECAL; INTRAVASCULAR; INTRAVENOUS; ORAL at 14:16

## 2020-07-22 RX ADMIN — IOHEXOL 100 ML: 350 INJECTION, SOLUTION INTRAVENOUS at 14:16

## 2021-01-15 DIAGNOSIS — Z23 NEED FOR VACCINATION: ICD-10-CM

## 2021-02-18 ENCOUNTER — HOSPITAL ENCOUNTER (OUTPATIENT)
Dept: RADIOLOGY | Facility: MEDICAL CENTER | Age: 71
End: 2021-02-18
Attending: INTERNAL MEDICINE
Payer: MEDICARE

## 2021-02-18 DIAGNOSIS — C85.11 UNSPECIFIED B-CELL LYMPHOMA, LYMPH NODES OF HEAD, FACE, AND NECK (HCC): ICD-10-CM

## 2021-02-18 PROCEDURE — 71260 CT THORAX DX C+: CPT | Mod: MH

## 2021-02-18 PROCEDURE — 700117 HCHG RX CONTRAST REV CODE 255: Performed by: INTERNAL MEDICINE

## 2021-02-18 RX ADMIN — IOHEXOL 100 ML: 350 INJECTION, SOLUTION INTRAVENOUS at 15:54

## 2021-02-18 RX ADMIN — IOHEXOL 25 ML: 240 INJECTION, SOLUTION INTRATHECAL; INTRAVASCULAR; INTRAVENOUS; ORAL at 15:54

## 2023-03-09 ENCOUNTER — HOSPITAL ENCOUNTER (OUTPATIENT)
Dept: RADIOLOGY | Facility: MEDICAL CENTER | Age: 73
End: 2023-03-09
Attending: INTERNAL MEDICINE
Payer: COMMERCIAL

## 2024-03-21 ENCOUNTER — HOSPITAL ENCOUNTER (OUTPATIENT)
Dept: RADIOLOGY | Facility: MEDICAL CENTER | Age: 74
End: 2024-03-21
Payer: COMMERCIAL